# Patient Record
Sex: MALE | Race: WHITE | HISPANIC OR LATINO | URBAN - METROPOLITAN AREA
[De-identification: names, ages, dates, MRNs, and addresses within clinical notes are randomized per-mention and may not be internally consistent; named-entity substitution may affect disease eponyms.]

---

## 2019-09-02 ENCOUNTER — HOSPITAL ENCOUNTER (INPATIENT)
Facility: HOSPITAL | Age: 84
LOS: 1 days | Discharge: HOME | DRG: 342 | End: 2019-09-03
Attending: EMERGENCY MEDICINE | Admitting: FAMILY MEDICINE
Payer: MEDICAID

## 2019-09-02 ENCOUNTER — APPOINTMENT (EMERGENCY)
Dept: RADIOLOGY | Facility: HOSPITAL | Age: 84
DRG: 342 | End: 2019-09-02
Payer: MEDICAID

## 2019-09-02 DIAGNOSIS — S82.042A CLOSED DISPLACED COMMINUTED FRACTURE OF LEFT PATELLA, INITIAL ENCOUNTER: Primary | ICD-10-CM

## 2019-09-02 PROBLEM — I10 ESSENTIAL HYPERTENSION: Chronic | Status: ACTIVE | Noted: 2019-09-02

## 2019-09-02 LAB
ALBUMIN SERPL BCP-MCNC: 3.6 G/DL (ref 3.5–5)
ALP SERPL-CCNC: 142 U/L (ref 46–116)
ALT SERPL W P-5'-P-CCNC: 46 U/L (ref 12–78)
ANION GAP SERPL CALCULATED.3IONS-SCNC: 6 MMOL/L (ref 4–13)
AST SERPL W P-5'-P-CCNC: 36 U/L (ref 5–45)
BASOPHILS # BLD AUTO: 0.03 THOUSANDS/ΜL (ref 0–0.1)
BASOPHILS NFR BLD AUTO: 1 % (ref 0–1)
BILIRUB SERPL-MCNC: 0.5 MG/DL (ref 0.2–1)
BUN SERPL-MCNC: 18 MG/DL (ref 5–25)
CALCIUM SERPL-MCNC: 8.9 MG/DL (ref 8.3–10.1)
CHLORIDE SERPL-SCNC: 106 MMOL/L (ref 100–108)
CO2 SERPL-SCNC: 30 MMOL/L (ref 21–32)
CREAT SERPL-MCNC: 0.92 MG/DL (ref 0.6–1.3)
EOSINOPHIL # BLD AUTO: 0.12 THOUSAND/ΜL (ref 0–0.61)
EOSINOPHIL NFR BLD AUTO: 3 % (ref 0–6)
ERYTHROCYTE [DISTWIDTH] IN BLOOD BY AUTOMATED COUNT: 13.9 % (ref 11.6–15.1)
GFR SERPL CREATININE-BSD FRML MDRD: 75 ML/MIN/1.73SQ M
GLUCOSE SERPL-MCNC: 100 MG/DL (ref 65–140)
HCT VFR BLD AUTO: 44.6 % (ref 36.5–49.3)
HGB BLD-MCNC: 14.6 G/DL (ref 12–17)
IMM GRANULOCYTES # BLD AUTO: 0.02 THOUSAND/UL (ref 0–0.2)
IMM GRANULOCYTES NFR BLD AUTO: 0 % (ref 0–2)
LYMPHOCYTES # BLD AUTO: 1.66 THOUSANDS/ΜL (ref 0.6–4.47)
LYMPHOCYTES NFR BLD AUTO: 35 % (ref 14–44)
MCH RBC QN AUTO: 29.1 PG (ref 26.8–34.3)
MCHC RBC AUTO-ENTMCNC: 32.7 G/DL (ref 31.4–37.4)
MCV RBC AUTO: 89 FL (ref 82–98)
MONOCYTES # BLD AUTO: 0.52 THOUSAND/ΜL (ref 0.17–1.22)
MONOCYTES NFR BLD AUTO: 11 % (ref 4–12)
NEUTROPHILS # BLD AUTO: 2.44 THOUSANDS/ΜL (ref 1.85–7.62)
NEUTS SEG NFR BLD AUTO: 50 % (ref 43–75)
NRBC BLD AUTO-RTO: 0 /100 WBCS
PLATELET # BLD AUTO: 169 THOUSANDS/UL (ref 149–390)
PMV BLD AUTO: 9.2 FL (ref 8.9–12.7)
POTASSIUM SERPL-SCNC: 3.7 MMOL/L (ref 3.5–5.3)
PROT SERPL-MCNC: 7.3 G/DL (ref 6.4–8.2)
RBC # BLD AUTO: 5.02 MILLION/UL (ref 3.88–5.62)
SODIUM SERPL-SCNC: 142 MMOL/L (ref 136–145)
WBC # BLD AUTO: 4.79 THOUSAND/UL (ref 4.31–10.16)

## 2019-09-02 PROCEDURE — 80053 COMPREHEN METABOLIC PANEL: CPT | Performed by: EMERGENCY MEDICINE

## 2019-09-02 PROCEDURE — 36415 COLL VENOUS BLD VENIPUNCTURE: CPT | Performed by: EMERGENCY MEDICINE

## 2019-09-02 PROCEDURE — 85025 COMPLETE CBC W/AUTO DIFF WBC: CPT | Performed by: EMERGENCY MEDICINE

## 2019-09-02 PROCEDURE — 99223 1ST HOSP IP/OBS HIGH 75: CPT | Performed by: FAMILY MEDICINE

## 2019-09-02 PROCEDURE — 99284 EMERGENCY DEPT VISIT MOD MDM: CPT

## 2019-09-02 PROCEDURE — 99284 EMERGENCY DEPT VISIT MOD MDM: CPT | Performed by: EMERGENCY MEDICINE

## 2019-09-02 PROCEDURE — 73564 X-RAY EXAM KNEE 4 OR MORE: CPT

## 2019-09-02 RX ORDER — TRAMADOL HYDROCHLORIDE 50 MG/1
50 TABLET ORAL EVERY 6 HOURS PRN
Status: DISCONTINUED | OUTPATIENT
Start: 2019-09-02 | End: 2019-09-03 | Stop reason: HOSPADM

## 2019-09-02 RX ORDER — PROPRANOLOL HYDROCHLORIDE 10 MG/1
10 TABLET ORAL 3 TIMES DAILY
COMMUNITY

## 2019-09-02 RX ORDER — OXYCODONE HYDROCHLORIDE 10 MG/1
10 TABLET ORAL EVERY 6 HOURS PRN
Status: DISCONTINUED | OUTPATIENT
Start: 2019-09-02 | End: 2019-09-02

## 2019-09-02 RX ORDER — PROPRANOLOL HYDROCHLORIDE 20 MG/1
10 TABLET ORAL 3 TIMES DAILY
Status: DISCONTINUED | OUTPATIENT
Start: 2019-09-02 | End: 2019-09-03 | Stop reason: HOSPADM

## 2019-09-02 RX ORDER — LOSARTAN POTASSIUM 50 MG/1
100 TABLET ORAL
Status: DISCONTINUED | OUTPATIENT
Start: 2019-09-02 | End: 2019-09-03 | Stop reason: HOSPADM

## 2019-09-02 RX ORDER — AMLODIPINE BESYLATE 5 MG/1
5 TABLET ORAL DAILY
COMMUNITY

## 2019-09-02 RX ORDER — LOSARTAN POTASSIUM 50 MG/1
100 TABLET ORAL DAILY
Status: DISCONTINUED | OUTPATIENT
Start: 2019-09-03 | End: 2019-09-02

## 2019-09-02 RX ORDER — LOSARTAN POTASSIUM 100 MG/1
100 TABLET ORAL DAILY
COMMUNITY

## 2019-09-02 RX ORDER — ACETAMINOPHEN 325 MG/1
975 TABLET ORAL EVERY 8 HOURS SCHEDULED
Status: DISCONTINUED | OUTPATIENT
Start: 2019-09-02 | End: 2019-09-03 | Stop reason: HOSPADM

## 2019-09-02 RX ORDER — AMLODIPINE BESYLATE 5 MG/1
5 TABLET ORAL DAILY
Status: DISCONTINUED | OUTPATIENT
Start: 2019-09-03 | End: 2019-09-03 | Stop reason: HOSPADM

## 2019-09-02 RX ORDER — ONDANSETRON 2 MG/ML
4 INJECTION INTRAMUSCULAR; INTRAVENOUS EVERY 6 HOURS PRN
Status: DISCONTINUED | OUTPATIENT
Start: 2019-09-02 | End: 2019-09-03 | Stop reason: HOSPADM

## 2019-09-02 RX ORDER — OXYCODONE HYDROCHLORIDE 5 MG/1
5 TABLET ORAL ONCE
Status: COMPLETED | OUTPATIENT
Start: 2019-09-02 | End: 2019-09-02

## 2019-09-02 RX ORDER — SODIUM CHLORIDE 9 MG/ML
50 INJECTION, SOLUTION INTRAVENOUS CONTINUOUS
Status: DISCONTINUED | OUTPATIENT
Start: 2019-09-03 | End: 2019-09-03 | Stop reason: HOSPADM

## 2019-09-02 RX ORDER — OXYCODONE HYDROCHLORIDE 5 MG/1
5 TABLET ORAL EVERY 6 HOURS PRN
Status: DISCONTINUED | OUTPATIENT
Start: 2019-09-02 | End: 2019-09-03 | Stop reason: HOSPADM

## 2019-09-02 RX ADMIN — LOSARTAN POTASSIUM 100 MG: 50 TABLET, FILM COATED ORAL at 22:06

## 2019-09-02 RX ADMIN — OXYCODONE HYDROCHLORIDE 5 MG: 5 TABLET ORAL at 20:26

## 2019-09-02 RX ADMIN — PROPRANOLOL HYDROCHLORIDE 10 MG: 20 TABLET ORAL at 20:27

## 2019-09-02 RX ADMIN — ACETAMINOPHEN 975 MG: 325 TABLET ORAL at 22:06

## 2019-09-02 NOTE — ASSESSMENT & PLAN NOTE
· POA, status post fall while dancing   Neurovascularly intact   · Admit patient to med/surg under inpatient status  · Ortho consult for surgical repair  · Analgesia  · PT/OT  · NPO after midnight

## 2019-09-02 NOTE — H&P
[address] [facility]  H&P- Maritza Lambert 1933, 80 y o  male MRN: [id number]    Unit/Bed#: JUSTINE Encounter: [id number]    Primary Care Provider: No primary care provider on file  Date and time admitted to hospital: 9/2/2019  4:40 PM        * Closed displaced comminuted fracture of left patella  Assessment & Plan  · POA, status post fall while dancing  Neurovascularly intact   · Admit patient to med/surg under inpatient status  · Ortho consult for surgical repair  · Analgesia  · PT/OT  · NPO after midnight     Essential hypertension  Assessment & Plan  · BP acceptable   · Continue home regimen       VTE Prophylaxis: Enoxaparin (Lovenox)  / sequential compression device   Code Status: Full Code   POLST: POLST form is not discussed and not completed at this time  Discussion with family: Family at bedside     Anticipated Length of Stay:  Patient will be admitted on an Inpatient basis with an anticipated length of stay of  Greater than 2 midnights  Justification for Hospital Stay: As per above assessment and plan     Total Time for Visit, including Counseling / Coordination of Care: 1 hour  Greater than 50% of this total time spent on direct patient counseling and coordination of care  Chief Complaint:   Fall, Knee Pain    History of Present Illness:    Maritza Lambert is a 80 y o  male with a history of HTN who presents with left knee pain  History obtained with assistance of family at bedside who provides translation  Reports that patient was dancing and fell onto his left knee  Was a mechanical fall and no LOC or headstrike  No dizziness or chest pain prior to fall  No numbness, tingling, or weakness in the legs  No prior knee injury  Otherwise denies complaints  Review of Systems:    Review of Systems   Constitutional: Negative  HENT: Negative  Eyes: Negative  Respiratory: Negative  Cardiovascular: Negative  Negative for chest pain  Gastrointestinal: Negative  Genitourinary: Negative  Musculoskeletal: Positive for arthralgias and gait problem  Neurological: Negative for weakness and numbness  All other systems reviewed and are negative  Past Medical and Surgical History:     Past Medical History:   Diagnosis Date    BPH (benign prostatic hyperplasia)     Hypertension        Past Surgical History:   Procedure Laterality Date    EYE SURGERY      PROSTATE BIOPSY         Meds/Allergies:    Prior to Admission medications    Medication Sig Start Date End Date Taking? Authorizing Provider   amLODIPine (NORVASC) 5 mg tablet Take 5 mg by mouth daily   Yes Historical Provider, MD   losartan (COZAAR) 100 MG tablet Take 100 mg by mouth daily   Yes Historical Provider, MD   propranolol (INDERAL) 10 mg tablet Take 10 mg by mouth 3 (three) times a day   Yes Historical Provider, MD     I have reviewed home medications with patient personally  Allergies: No Known Allergies    Social History:     Marital Status: /Civil Union   Occupation: Noncontributory   Patient Pre-hospital Living Situation: Home  Patient Pre-hospital Level of Mobility: Full  Patient Pre-hospital Diet Restrictions: None  Substance Use History:   Social History     Substance and Sexual Activity   Alcohol Use Never    Frequency: Never     Social History     Tobacco Use   Smoking Status Never Smoker   Smokeless Tobacco Never Used     Social History     Substance and Sexual Activity   Drug Use Never       Family History:    History reviewed  No pertinent family history  Physical Exam:     Vitals:   Blood Pressure: 137/93 (09/02/19 1641)  Pulse: 70 (09/02/19 1641)  Respirations: 16 (09/02/19 1641)  Weight - Scale: 62 8 kg (138 lb 7 2 oz) (09/02/19 1641)  SpO2: 98 % (09/02/19 1641)    Physical Exam   Constitutional: He is oriented to person, place, and time  Vital signs are normal  He appears well-developed and well-nourished  Non-toxic appearance  No distress     HENT:   Head: Normocephalic and atraumatic  Mouth/Throat: Mucous membranes are not dry  Eyes: Pupils are equal, round, and reactive to light  Conjunctivae and EOM are normal  No scleral icterus  Pupils are equal    Neck: Neck supple  Cardiovascular: Normal rate, regular rhythm, S1 normal, S2 normal, normal heart sounds and intact distal pulses  Exam reveals no S3 and no S4  No murmur heard  Pulmonary/Chest: Effort normal and breath sounds normal  No accessory muscle usage or stridor  No respiratory distress  He has no decreased breath sounds  He has no wheezes  He has no rhonchi  He has no rales  He exhibits no tenderness  Abdominal: Soft  Bowel sounds are normal  He exhibits no distension and no mass  There is no tenderness  There is no rigidity, no rebound and no guarding  Musculoskeletal:        Left knee: Tenderness found  Left leg in knee immobilizer    Neurological: He is alert and oriented to person, place, and time  He is not disoriented  No cranial nerve deficit or sensory deficit  Skin: Skin is warm and dry  Additional Data:     Lab Results: I have personally reviewed pertinent reports  Results from last 7 days   Lab Units 09/02/19  1748   WBC Thousand/uL 4 79   HEMOGLOBIN g/dL 14 6   HEMATOCRIT % 44 6   PLATELETS Thousands/uL 169   NEUTROS PCT % 50   LYMPHS PCT % 35   MONOS PCT % 11   EOS PCT % 3     Results from last 7 days   Lab Units 09/02/19  1748   SODIUM mmol/L 142   POTASSIUM mmol/L 3 7   CHLORIDE mmol/L 106   CO2 mmol/L 30   BUN mg/dL 18   CREATININE mg/dL 0 92   ANION GAP mmol/L 6   CALCIUM mg/dL 8 9   ALBUMIN g/dL 3 6   TOTAL BILIRUBIN mg/dL 0 50   ALK PHOS U/L 142*   ALT U/L 46   AST U/L 36   GLUCOSE RANDOM mg/dL 100                       Imaging: I have personally reviewed pertinent reports  XR knee 4+ vw left injury   ED Interpretation by Jordan Del Real DO (09/02 1725)   Comminuted, displaced patellar fracture        Final Result by Otilio Baer MD (09/02 1809)      Mildly displaced acute comminuted patellar fracture  Findings concur with ED results as provided in PACS viewer/EPIC at the time of interpretation  Workstation performed: [id number]             EKG, Pathology, and Other Studies Reviewed on Admission:   · XR Left Knee: Mildly displaced acute comminuted patellar fracture     Allscripts / Epic Records Reviewed: Yes     ** Please Note: This note has been constructed using a voice recognition system   **

## 2019-09-03 VITALS
RESPIRATION RATE: 18 BRPM | TEMPERATURE: 97.8 F | OXYGEN SATURATION: 96 % | DIASTOLIC BLOOD PRESSURE: 64 MMHG | WEIGHT: 138.45 LBS | HEART RATE: 55 BPM | HEIGHT: 60 IN | BODY MASS INDEX: 27.18 KG/M2 | SYSTOLIC BLOOD PRESSURE: 107 MMHG

## 2019-09-03 LAB
ABO GROUP BLD: NORMAL
APTT PPP: 32 SECONDS (ref 23–37)
BLD GP AB SCN SERPL QL: NEGATIVE
ERYTHROCYTE [DISTWIDTH] IN BLOOD BY AUTOMATED COUNT: 13.9 % (ref 11.6–15.1)
HCT VFR BLD AUTO: 39.1 % (ref 36.5–49.3)
HGB BLD-MCNC: 12.6 G/DL (ref 12–17)
INR PPP: 1.1 (ref 0.84–1.19)
MCH RBC QN AUTO: 29 PG (ref 26.8–34.3)
MCHC RBC AUTO-ENTMCNC: 32.2 G/DL (ref 31.4–37.4)
MCV RBC AUTO: 90 FL (ref 82–98)
PLATELET # BLD AUTO: 158 THOUSANDS/UL (ref 149–390)
PMV BLD AUTO: 9.7 FL (ref 8.9–12.7)
PROTHROMBIN TIME: 13.6 SECONDS (ref 11.6–14.5)
RBC # BLD AUTO: 4.35 MILLION/UL (ref 3.88–5.62)
RH BLD: POSITIVE
SPECIMEN EXPIRATION DATE: NORMAL
WBC # BLD AUTO: 6.73 THOUSAND/UL (ref 4.31–10.16)

## 2019-09-03 PROCEDURE — G8979 MOBILITY GOAL STATUS: HCPCS

## 2019-09-03 PROCEDURE — 97163 PT EVAL HIGH COMPLEX 45 MIN: CPT

## 2019-09-03 PROCEDURE — 85610 PROTHROMBIN TIME: CPT | Performed by: PHYSICIAN ASSISTANT

## 2019-09-03 PROCEDURE — 97760 ORTHOTIC MGMT&TRAING 1ST ENC: CPT

## 2019-09-03 PROCEDURE — 85027 COMPLETE CBC AUTOMATED: CPT | Performed by: PHYSICIAN ASSISTANT

## 2019-09-03 PROCEDURE — 86900 BLOOD TYPING SEROLOGIC ABO: CPT | Performed by: PHYSICIAN ASSISTANT

## 2019-09-03 PROCEDURE — 99254 IP/OBS CNSLTJ NEW/EST MOD 60: CPT | Performed by: ORTHOPAEDIC SURGERY

## 2019-09-03 PROCEDURE — 86850 RBC ANTIBODY SCREEN: CPT | Performed by: PHYSICIAN ASSISTANT

## 2019-09-03 PROCEDURE — G8978 MOBILITY CURRENT STATUS: HCPCS

## 2019-09-03 PROCEDURE — 86901 BLOOD TYPING SEROLOGIC RH(D): CPT | Performed by: PHYSICIAN ASSISTANT

## 2019-09-03 PROCEDURE — 99239 HOSP IP/OBS DSCHRG MGMT >30: CPT | Performed by: NURSE PRACTITIONER

## 2019-09-03 PROCEDURE — 85730 THROMBOPLASTIN TIME PARTIAL: CPT | Performed by: PHYSICIAN ASSISTANT

## 2019-09-03 RX ORDER — OXYCODONE HYDROCHLORIDE 5 MG/1
5 TABLET ORAL ONCE
Status: COMPLETED | OUTPATIENT
Start: 2019-09-03 | End: 2019-09-03

## 2019-09-03 RX ADMIN — ACETAMINOPHEN 975 MG: 325 TABLET ORAL at 06:20

## 2019-09-03 RX ADMIN — MORPHINE SULFATE 2 MG: 2 INJECTION, SOLUTION INTRAMUSCULAR; INTRAVENOUS at 06:21

## 2019-09-03 RX ADMIN — OXYCODONE HYDROCHLORIDE 5 MG: 5 TABLET ORAL at 00:57

## 2019-09-03 RX ADMIN — OXYCODONE HYDROCHLORIDE 5 MG: 5 TABLET ORAL at 12:34

## 2019-09-03 RX ADMIN — SODIUM CHLORIDE 50 ML/HR: 0.9 INJECTION, SOLUTION INTRAVENOUS at 00:07

## 2019-09-03 NOTE — CONSULTS
Orthopedics   Mary Grace Morales 80 y o  male MRN: [id number]  Unit/Bed#: -01      Chief Complaint:   Left knee pain    HPI:   80 y o  male status post fall from standing height complaining of left knee pain and inability to bear weight  Patient was dancing when he fell directly onto his knee  Pain can be as high as 7 or 8 out 10 as low as 5/10  His pain was immediate inability walk was part of the issue  He came to the ER right away  The injury occurred at 4:00 a m  Yesterday  No previous issues with this knee  Pain is made worse with motion or contact to the area  Denies motor or sensory deficit      Review Of Systems:   · Skin: Normal  · Neuro: See HPI  · Musculoskeletal: See HPI  · 14 point review of systems negative except as stated above     Past Medical History:   Past Medical History:   Diagnosis Date    BPH (benign prostatic hyperplasia)     Hypertension        Past Surgical History:   Past Surgical History:   Procedure Laterality Date    EYE SURGERY      PROSTATE BIOPSY         Family History:  Family history reviewed and non-contributory  History reviewed  No pertinent family history      Social History:  Social History     Socioeconomic History    Marital status: /Civil Union     Spouse name: None    Number of children: None    Years of education: None    Highest education level: None   Occupational History    None   Social Needs    Financial resource strain: None    Food insecurity:     Worry: None     Inability: None    Transportation needs:     Medical: None     Non-medical: None   Tobacco Use    Smoking status: Never Smoker    Smokeless tobacco: Never Used   Substance and Sexual Activity    Alcohol use: Never     Frequency: Never    Drug use: Never    Sexual activity: None   Lifestyle    Physical activity:     Days per week: None     Minutes per session: None    Stress: None   Relationships    Social connections:     Talks on phone: None     Gets together: None Attends Ca[employer] service: None     Active member of club or organization: None     Attends meetings of clubs or organizations: None     Relationship status: None    Intimate partner violence:     Fear of current or ex partner: None     Emotionally abused: None     Physically abused: None     Forced sexual activity: None   Other Topics Concern    None   Social History Narrative    None       Allergies:   No Known Allergies        Labs:  0   Lab Value Date/Time    HCT 39 1 09/03/2019 0452    HCT 44 6 09/02/2019 1748    HGB 12 6 09/03/2019 0452    HGB 14 6 09/02/2019 1748    INR 1 10 09/03/2019 0452    WBC 6 73 09/03/2019 0452    WBC 4 79 09/02/2019 1748       Meds:    Current Facility-Administered Medications:     acetaminophen (TYLENOL) tablet 975 mg, 975 mg, Oral, Q8H [facility], Adams Rendon PA-C, 975 mg at 09/03/19 0620    amLODIPine (NORVASC) tablet 5 mg, 5 mg, Oral, Daily, Adams Rendon PA-C    enoxaparin (LOVENOX) subcutaneous injection 40 mg, 40 mg, Subcutaneous, Daily, Adams Rendon PA-C, Stopped at 09/03/19 0800    losartan (COZAAR) tablet 100 mg, 100 mg, Oral, HS, Sylvester F Jahoor, DO, 100 mg at 09/02/19 2206    morphine injection 2 mg, 2 mg, Intravenous, Q6H PRN, HENRRY Stoner-TAMARA, 2 mg at 09/03/19 0621    ondansetron (ZOFRAN) injection 4 mg, 4 mg, Intravenous, Q6H PRN, HENRRY Stoner-C    oxyCODONE (ROXICODONE) IR tablet 5 mg, 5 mg, Oral, Q6H PRN, Sylvester F Jahoor, DO, 5 mg at 09/03/19 0057    propranolol (INDERAL) tablet 10 mg, 10 mg, Oral, TID, HENRRY Stoner-TAMARA, 10 mg at 09/02/19 2027    sodium chloride 0 9 % infusion, 50 mL/hr, Intravenous, Continuous, Adams Rendon PA-C, Last Rate: 50 mL/hr at 09/03/19 0007, 50 mL/hr at 09/03/19 0007    traMADol (ULTRAM) tablet 50 mg, 50 mg, Oral, Q6H PRN, Adams Rendon PA-C    Blood Culture:   No results found for: BLOODCX    Wound Culture:   No results found for: WOUNDCULT    Ins and Outs:  I/O last 24 hours:   In: -   Out: 350 [Urine:350]          Physical Exam:   /64 (BP Location: Left arm)   Pulse 55   Temp 97 8 °F (36 6 °C) (Oral)   Resp 18   Ht 5' (1 524 m)   Wt 62 8 kg (138 lb 7 2 oz)   SpO2 96%   BMI 27 04 kg/m²   Gen: Alert and oriented to person, place, time  HEENT: EOMI, eyes clear, moist mucus membranes, hearing intact  Respiratory: Bilateral chest rise  No audible wheezing found  Cardiovascular: Regular Rate and Rhythm  Abdomen: soft nontender/nondistended  Musculoskeletal: left lower extremity  · Skin intact  · Tender to palpation over entire knee with a palpable gap in patella  · Moderate knee effusion ecchymosis also evidence  · Unable to perform a straight leg raise  · Unable to tolerate varus/valgus stress due to pain  · Sensation intact L3-S1  · Intact ankle dorsi/plantar flexion, EHL/FHL  · 2+ DP pulse  · Trophic changes of toenails    Radiology:   I personally reviewed the films  X-rays left knee shows transverse comminuted distal 3rd aspect the patella  There is displacement and angulation    _*_*_*_*_*_*_*_*_*_*_*_*_*_*_*_*_*_*_*_*_*_*_*_*_*_*_*_*_*_*_*_*_*_*_*_*_*_*_*_*_*    Assessment:  80 y o  male status post fall with left patella fracture  Plan:   · TT weight bearing left lower extremity in knee immobilizer  · Patient has [place] Medicaid  This presents a problem for postoperative care and follow-up  Initial surgery would be okay but follow-up thereafter would be difficult no therapy or rehab would be covered for the 1st 90 days  We have spoken to the granddaughter and the patient with regards to this  Preference is to be transferred to an accepting hospital in [facility]  Case management working on this  Will need a excepting facility internal medicine and orthopedics  · Dispo: transfer to accepting facitlity   If by car We will order a TROM brace for better support during transport if by ambulance then current [address] adequate    Kody Mccartney DO

## 2019-09-03 NOTE — DISCHARGE SUMMARY
Discharge- Nat Barcenas 1933, 80 y o  male MRN: [id number]    Unit/Bed#: -01 Encounter: [id number]    Primary Care Provider: No primary care provider on file  Date and time admitted to hospital: 9/2/2019  4:40 PM        * Closed displaced comminuted fracture of left patella  Assessment & Plan  · POA, status post fall while dancing  Neurovascularly intact   · Knee XR  · Mildly displaced acute comminuted patellar fracture  · Admit patient to med/surg under inpatient status   · Ortho consult for surgical repair  · Ortho surgery required; pt and pt family requesting d/c due to [employer]  · They will be transporting him to ER in [place] for further fracture management  · Pavithra Dao was attending to facility transfer but family wanted discharge immediately  · Analgesia  · Discharge per family request to self transport to ER in [id number] [facility] hypertension  [address]  · BP acceptable   · Continued home regimen      Discharging Physician / Practitioner: Jay Amanda  PCP: No primary care provider on file  Admission Date:   Admission Orders (From admission, onward)     Ordered        09/02/19 1809  Inpatient Admission  Once                   Discharge Date: 09/03/19    Resolved Problems  Date Reviewed: 9/3/2019    None          Consultations During Hospital Stay:  · Ortho  · Case management     Procedures Performed:   · Knee XR  · Mildly displaced acute comminuted patellar fracture    Significant Findings / Test Results:   · XR as above      Complications:  Pt has [employer]  Family requesting ASAP discharge so they can transport him themselves to an ER in [place]    Reason for Admission: patellar fx with potential surgical intervention    Hospital Course:     Nat Barcenas is a 80 y o  male patient who originally presented to the hospital on 9/2/2019 due to acute patellar fracture  Pt was admitted to have ortho consult for need for surgery   Pt has [employer] and per   Raven Gillis, there is a liability to have surgery and follow up by different medical team  Pt and pt family requesting immediate discharge for [place] ER admission per self transport  Pt and family has been advised that we could facilitate hospital hospital transfer, but family is not agreeable to timeline  Will discharge for further management at [facility]      Condition at Discharge: poor     Discharge Day Visit / Exam:     Subjective:  Pt reports pain 5/10  No other complaints  Vitals: Blood Pressure: 107/64 (09/03/19 0717)  Pulse: 55 (09/03/19 0717)  Temperature: 97 8 °F (36 6 °C) (09/03/19 0717)  Temp Source: Oral (09/03/19 0717)  Respirations: 18 (09/03/19 0717)  Height: 5' (152 4 cm) (09/02/19 1858)  Weight - Scale: 62 8 kg (138 lb 7 2 oz) (09/02/19 1641)  SpO2: 96 % (09/03/19 0717)  Exam:   Physical Exam   Constitutional: He is oriented to person, place, and time  He appears well-nourished  HENT:   Head: Normocephalic  Eyes: Pupils are equal, round, and reactive to light  Cardiovascular: Normal rate, regular rhythm and normal heart sounds  Pulmonary/Chest: Effort normal and breath sounds normal  No respiratory distress  He has no wheezes  Abdominal: Soft  Bowel sounds are normal  He exhibits no distension  There is no tenderness  Musculoskeletal: He exhibits tenderness  L knee currently in immobilizer  Neurological: He is alert and oriented to person, place, and time  Skin: Skin is warm and dry  Capillary refill takes less than 2 seconds  Psychiatric: He has a normal mood and affect  Judgment and thought content normal      Discussion with Family: discussed with family at length regarding hospital hospital transfer  Family not willing to wait for transport and arrangements to be made  Requested he be discharged to be transported by family to ER in [place]  Discharge instructions/Information to patient and family:   See after visit summary for information provided to patient and family  Provisions for Follow-Up Care:  See after visit summary for information related to follow-up care and any pertinent home health orders  Disposition:     Other: care of family to be taken to ER in [place]    Discharge Statement:  I spent 45 minutes discharging the patient  This time was spent on the day of discharge  I had direct contact with the patient on the day of discharge  Greater than 50% of the total time was spent examining patient, answering all patient questions, arranging and discussing plan of care with patient as well as directly providing post-discharge instructions  Additional time then spent on discharge activities  Discharge Medications:  See after visit summary for reconciled discharge medications provided to patient and family        ** Please Note: This note has been constructed using a voice recognition system **

## 2019-09-03 NOTE — DISCHARGE INSTR - AVS FIRST PAGE
Please take to [facility] for further inpatient management of acute patellar fracture  Pt requires immobilizer for transfer  Please maintain alignment and non weight bearing on left leg until orthopedic surgeon approves

## 2019-09-03 NOTE — UTILIZATION REVIEW
Please note this is for an IP request  Send all determinations to our UR Dept Fax [phone]    Notification of Inpatient Admission/Inpatient Authorization Request  This is a Notification of Inpatient Admission/Request for Inpatient Authorization for our facility [address] [facility]  Be advised that this patient was admitted to our facility under Inpatient Status  Please contact the Utilization Review Department where the patient is receiving care services for additional admission information  Place of Service Code: 24   Place of Service Name: Inpatient Hospital  Presentation Date & Time: 9/2/2019  4:40 PM  Inpatient Admission Date & Time: 9/2/19 1809  Discharge Date & Time: 9/3/2019  1:18 PM   Discharge Disposition (if discharged): Admitted as an inpatient to this hospital  Attending Physician: Uriel Snell Md [[id number]]   Attending Physician:  CHRISTINE Pizarro  [facility] Practioner ID- [id number]  [address] [place] [address]  Phone 1: [phone]  Fax: [phone]  Admission Orders (From admission, onward)     Ordered        09/02/19 1809  Inpatient Admission  Once                   Facility: [facility]  Utilization Review Department  Phone: [phone]; Fax [phone]  [email or web address]  ATTENTION: Please call with any questions or concerns to [phone]  and carefully listen to the prompts so that you are directed to the right person  Send all requests for admission clinical reviews, approved or denied determinations and any other requests to fax [phone]   All voicemails are confidential

## 2019-09-03 NOTE — PHYSICAL THERAPY NOTE
Orthotic Note  Date: 9/3/2019  Patient Name: Greer Bennett   Reason for Consult: After speaking to [facility] from Suhail Bearden and Nicole Acharya from case management, and several episodes of clarification via [facility] via phone and in person from ortho (who reportedly was speaking to Dr Diane Benavides), pt recommended to be fit for TROM brace locked in extension prior to DC back to [place] via family transport in car  With pt and family permission, Pt fitted for TROM brace w/family translating to pt (w/ pt's permission) and pt/family educated in purpose of brace, don/doffing of brace, 2 finger breaths of space @ straps, keeping brace locked in extension  Via translation, pt reports brace feels "better" than knee extension brace, but pt still requires A for brace management  Recommendations: Recommend pt wear brace as per ortho, and f/u with ortho upon DC in [place]         PHYSICAL THERAPY EVALUATION  NAME:  [facility] [address]: 09/03/19    AGE:   80 y o  Mrn:   [id number]  ADMIT DX:  Knee injury [S89 90XA]  Closed displaced comminuted fracture of left patella, initial encounter [S82 042A]    Past Medical History:   Diagnosis Date    BPH (benign prostatic hyperplasia)     Hypertension      Length Of Stay: 1  Performed at least 2 patient identifiers during session: Name and Birthday  PHYSICAL THERAPY EVALUATION :    09/03/19 1300   Note Type   Note type Eval only   Pain Assessment   Pain Assessment 0-10   Pain Score 7   Pain Type Acute pain   Pain Location Knee   Pain Orientation Left   Effect of Pain on Daily Activities limits speed and indep of mobility, limits dependent position of LE   Patient's Stated Pain Goal No pain   Hospital Pain Intervention(s) Repositioned; Ambulation/increased activity; Medication (See MAR)  (RN medicated pt prior to session)   [address]   (steps)   Home Equipment   (intermittent cane use)   Prior Function   Level of [place] Independent with ADLs and functional mobility ADL Assistance Independent   Falls in the last 6 months 1 to 4   Restrictions/Precautions   Weight Bearing Precautions Per Order Yes   LLE Weight Bearing Per Order TTWB   Braces or Orthoses LE Immobilizer  (fitted into hinged knee brace)   General   Family/Caregiver Present No   Cognition   Overall Cognitive Status WFL   Arousal/Participation Alert   Orientation Level Oriented X4   Memory Within functional limits   Following Commands Follows one step commands with increased time or repetition   RUE Strength   RUE Overall Strength Within Functional Limits - strength 5/5   LUE Strength   LUE Overall Strength Within Functional Limits - strength 5/5   Strength RLE   R Hip Flexion 4+/5   R Knee Extension 4+/5   R Ankle Dorsiflexion 4+/5   Strength LLE   L Hip Flexion   (<3)   L Ankle Dorsiflexion   (tested to 3)   Coordination   Movements are Fluid and Coordinated 1  (but antalgic)   Sensation   (vision and hearing)   Light Touch   RLE Light Touch Grossly intact   LLE Light Touch Grossly intact   Bed Mobility   Supine to Sit 3  Moderate assistance   Additional items Assist x 1;HOB elevated; Increased time required;Verbal cues   Transfers   Sit to Stand 4  Minimal assistance   Additional items Assist x 1; Increased time required;Verbal cues  (to keep TTWB on LLE)   Stand to Sit 4  Minimal assistance   Additional items Assist x 1; Increased time required;Bed elevated  (to keep TTWB on LLE)   Stand pivot 4  Minimal assistance  (turn toward R side  )   Additional items Assist x 2; Increased time required;Verbal cues;Armrests  (to keep TTWB on LLE)   Car transfer 4  Minimal assistance  (pt actually able to hop on 1LE to get close w/ TTWB)   Additional items Assist x 2; Increased time required;Verbal cues;Armrests  (toward R side, 3rd A to ease trasition of scoot across)   Additional Comments Pt/family instructed to perform car transfer to drivers side rear seat and scoot across back to keep LLE elevated, allow for use of seat belt, and mobility of RLE    Ambulation/Elevation   Gait pattern Not tested  (as family is requesting to DC to [place]/to car  )   Balance   Static Sitting Good   Static Standing Fair -   Endurance Deficit   Endurance Deficit Yes   Endurance Deficit Description limited standing tolerance   Activity Tolerance   Activity Tolerance Patient limited by pain; Patient limited by fatigue   Medical Staff Made Aware care coordination w/Royal and Lucy Dominguez from case management, Jessica Gaona and Mony Ralph from ortho; Ivan Castillo clinical director PT; Karena Claude from [place] [id number] spoke to Beatris Prajapati RN   Assessment   Prognosis Fair   Problem List Decreased strength;Decreased range of motion; Impaired balance;Decreased mobility; Decreased coordination;Decreased cognition;Decreased endurance;Orthopedic restrictions;Pain  (gait deviations)   Barriers to Discharge Decreased caregiver support   Goals   Patient Goals to get to [address]   Treatment/Interventions Spoke to nursing;Spoke to case management;Spoke to advanced practitioner   PT Frequency   (Pt DC to care of family reportedly requesting DC)   Recommendation   Recommendation   (recommend pivot transfers w/A Of 1-2)   Equipment Recommended   (eventually walker vs WC)   Barthel Index   Feeding 10   Bathing 0   Grooming Score 5   Dressing Score 5   Bladder Score 10   Bowels Score 10   Toilet Use Score 5   Transfers (Bed/Chair) Score 5   Mobility (Level Surface) Score 0   Stairs Score 0   Barthel Index Score 50   (Please find full objective findings from PT assessment regarding body systems outlined above)  Assessment: Pt is a 80 y o  male seen for PT evaluation s/p admit to [facility] on 9/2/2019 w/ Closed displaced comminuted fracture of left patella  Ortho Plan: "TT weight bearing left lower extremity in knee immobilizer, Dispo: transfer to accepting facility   If by car:TROM brace for better support during transport if by ambulance then [facility] "  Order placed for PT  Upon evaluation: Pt requires 1 person assistance for bed mobility and 1-2 person assistance for transfers bed to WC, WC to car, but A Of 3rd to complete across back seat of car  Ambulation NT as family does not wish to get WC, walker for DC while in [place]  Pt's clinical presentation is currently unstable/unpredictable given the functional mobility deficits above, especially weakness, decreased ROM, pain and orthopedic restrictions, coupled with fall risks including limited WB status, hx of falls, impaired balance and impaired coordination, and combined with medical complications of need for surgery  From PT/mobility standpoint, recommendation at time of d/c would be that pt requires A of 1-2 for mobility to perform continued mobility, but pt was able to maintain TTWB in [facility] brace  Pt//family agreeable to brace, but declined any DME until they return to [place]  Pt/family verbalized understanding of training during PT eval  No further IPPT indicated at this time, but strongly recommend PT follow up upon next level of care in [place]  Comorbidities affecting pt's physical performance at time of assessment include: HTN  Personal factors affecting pt at time of IE include: affordability, steps to enter environment, multi-level environment, advanced age, inability to perform IADLs, recent fall(s) and preferred language not [place] (language barrier)       Andrea Moore, PT, DPT

## 2019-09-03 NOTE — ASSESSMENT & PLAN NOTE
· POA, status post fall while dancing  Neurovascularly intact   · Knee XR  · Mildly displaced acute comminuted patellar fracture    · Admit patient to med/surg under inpatient status   · Ortho consult for surgical repair  · Ortho surgery required; pt and pt family requesting d/c due to [employer]  · They will be transporting him to ER in [place] for further fracture management  · [facility] was attending to facility transfer but family wanted discharge immediately  · Analgesia  · Discharge per family request to self transport to ER in [place]

## 2019-09-03 NOTE — SOCIAL WORK
CM met with consulting physician Dr Casimiro Garcia at bedside to review plan with patient and granddaughter at bedside  Consulting physician and Dr Fenton determined that patient would be best served in [place] due to follow up care needs  Patient and granddaughter agree with plan  CM will await specific hospital patient uses and CM will help with process  CM department will continue to follow through discharge  CM was informed that patients spouse and daughter, and granddaughter planned to pick patient up and take him to a [facility] ED  Family declined hospital transfer and opted to transfer patient via car  Prior to discharge ortho brace ordered  Ortho brace being orders and given to patient  No other CM needs

## 2019-09-03 NOTE — PLAN OF CARE
Problem: Potential for Falls  Goal: Patient will remain free of falls  Description  INTERVENTIONS:  - Assess patient frequently for physical needs  -  Identify cognitive and physical deficits and behaviors that affect risk of falls    -  [place] fall precautions as indicated by assessment   - Educate patient/family on patient safety including physical limitations  - Instruct patient to call for assistance with activity based on assessment  - Modify environment to reduce risk of injury  - Consider OT/PT consult to assist with strengthening/mobility  Outcome: Progressing     Problem: Prexisting or High Potential for Compromised Skin Integrity  Goal: Skin integrity is maintained or improved  Description  INTERVENTIONS:  - Identify patients at risk for skin breakdown  - Assess and monitor skin integrity  - Assess and monitor nutrition and hydration status  - Monitor labs   - Assess for incontinence   - Turn and reposition patient  - Assist with mobility/ambulation  - Relieve pressure over bony prominences  - Avoid friction and shearing  - Provide appropriate hygiene as needed including keeping skin clean and dry  - Evaluate need for skin moisturizer/barrier cream  - Collaborate with interdisciplinary team   - Patient/family teaching  - Consider wound care consult   Outcome: Progressing

## 2019-09-03 NOTE — PLAN OF CARE
Problem: Potential for Falls  Goal: Patient will remain free of falls  Description  INTERVENTIONS:  - Assess patient frequently for physical needs  -  Identify cognitive and physical deficits and behaviors that affect risk of falls    -  [place] fall precautions as indicated by assessment   - Educate patient/family on patient safety including physical limitations  - Instruct patient to call for assistance with activity based on assessment  - Modify environment to reduce risk of injury  - Consider OT/PT consult to assist with strengthening/mobility  Outcome: Progressing     Problem: Prexisting or High Potential for Compromised Skin Integrity  Goal: Skin integrity is maintained or improved  Description  INTERVENTIONS:  - Identify patients at risk for skin breakdown  - Assess and monitor skin integrity  - Assess and monitor nutrition and hydration status  - Monitor labs   - Assess for incontinence   - Turn and reposition patient  - Assist with mobility/ambulation  - Relieve pressure over bony prominences  - Avoid friction and shearing  - Provide appropriate hygiene as needed including keeping skin clean and dry  - Evaluate need for skin moisturizer/barrier cream  - Collaborate with interdisciplinary team   - Patient/family teaching  - Consider wound care consult   Outcome: Progressing

## 2019-09-03 NOTE — UTILIZATION REVIEW
Initial Clinical Review    Admission: Date/Time/Statement: Inpatient Admission Orders (From admission, onward)     Ordered        09/02/19 1809  Inpatient Admission  Once                   Orders Placed This Encounter   Procedures    Inpatient Admission     Standing Status:   Standing     Number of Occurrences:   1     Order Specific Question:   Admitting Physician     Answer:   Snato La [[id number]]     Order Specific Question:   Level of Care     Answer:   Med Surg [16]     Order Specific Question:   Estimated length of stay     Answer:   More than 2 Midnights     Order Specific Question:   Certification     Answer:   I certify that inpatient services are medically necessary for this patient for a duration of greater than two midnights  See H&P and MD Progress Notes for additional information about the patient's course of treatment  ED Arrival Information     Expected Arrival Acuity Means of Arrival Escorted By Service Admission Type    - 9/2/2019 16:40 Urgent Wheelchair Family Member Hospitalist Urgent    Arrival Complaint    KNEE INJURY        Chief Complaint   Patient presents with    Knee Injury     Pt fell directly onto left knee  Assessment/Plan: This is a 80year old male from home to ED admitted as inpatient due to closed displaced comminuted fracture of left patella  Presented post fall about 30 minutes prior to arrival, he was dancing and lost balance  He had immediate pain of left knee with associated swelling and ecchymosis  On exam of left knee - has tenderness over the patella with decreased ROM, swelling and ecchymosis  Xray left knee shows comminuted displaced patellar fracture  Pain control in progress  Orthopedics consulted  9/3/2019 per orthopedics - 80 y o  male status post fall with left patella fracture  Plan is TT weight bearing LLE in knee immobilizer  Recommend transfer to accepting facility in [facility]        ED Triage Vitals   Temperature Pulse Respirations Blood Pressure SpO2   09/02/19 1846 09/02/19 1641 09/02/19 1641 09/02/19 1641 09/02/19 1641   98 7 °F (37 1 °C) 70 16 137/93 98 %      Temp Source Heart Rate Source Patient Position - Orthostatic VS BP Location FiO2 (%)   09/02/19 1846 09/02/19 1641 09/02/19 1641 09/02/19 1641 --   Oral Monitor Lying Right arm       Pain Score       09/02/19 1641       7        Wt Readings from Last 1 Encounters:   09/02/19 62 8 kg (138 lb 7 2 oz)     Additional Vital Signs:   09/03/19 0717  97 8 °F (36 6 °C)  55  18  107/64  78  96 %  None (Room air)  Lying   09/02/19 2325  98 9 °F (37 2 °C)  62  18  122/68  --  98 %  None (Room air)  Lying   09/02/19 2203  --  68  --  146/79  --  --  --  Lying   09/02/19 2025  --  66  --  126/65  --  --  --  Lying   09/02/19 1846  98 7 °F (37 1 °C)  60  16  133/78  --  98 %  None (Room air)         Pertinent Labs/Diagnostic Test Results:   9/2/2019  Xray left knee  - Mildly displaced acute comminuted patellar fracture  Results from last 7 days   Lab Units 09/03/19  0452 09/02/19  1748   WBC Thousand/uL 6 73 4 79   HEMOGLOBIN g/dL 12 6 14 6   HEMATOCRIT % 39 1 44 6   PLATELETS Thousands/uL 158 169   NEUTROS ABS Thousands/µL  --  2 44     Results from last 7 days   Lab Units 09/02/19  1748   SODIUM mmol/L 142   POTASSIUM mmol/L 3 7   CHLORIDE mmol/L 106   CO2 mmol/L 30   ANION GAP mmol/L 6   BUN mg/dL 18   CREATININE mg/dL 0 92   EGFR ml/min/1 73sq m 75   CALCIUM mg/dL 8 9     Results from last 7 days   Lab Units 09/02/19  1748   AST U/L 36   ALT U/L 46   ALK PHOS U/L 142*   TOTAL PROTEIN g/dL 7 3   ALBUMIN g/dL 3 6   TOTAL BILIRUBIN mg/dL 0 50     Results from last 7 days   Lab Units 09/02/19  1748   GLUCOSE RANDOM mg/dL 100     Results from last 7 days   Lab Units 09/03/19  0452   PROTIME seconds 13 6   INR  1 10   PTT seconds 32     ED Treatment:   Medication Administration from 09/02/2019 1640 to 09/02/2019 1846     None        Past Medical History:   Diagnosis Date    BPH (benign prostatic hyperplasia)     Hypertension      Present on Admission:   Closed displaced comminuted fracture of left patella   Essential hypertension      Admitting Diagnosis: Knee injury [S89 90XA]  Closed displaced comminuted fracture of left patella, initial encounter [S82 042A]  Age/Sex: 80 y o  male  Admission Orders: 9/2/2019  1809 INPATIENT     Current Facility-Administered Medications:  acetaminophen 975 mg Oral Q8H [facility] & longterm    amLODIPine 5 mg Oral Daily    enoxaparin 40 mg Subcutaneous Daily    losartan 100 mg Oral HS    morphine injection- used x 1  2 mg Intravenous Q6H PRN 0621   ondansetron 4 mg Intravenous Q6H PRN    oxyCODONE- used x 1  5 mg Oral Q6H PRN    oxyCODONE 5 mg Oral Once    propranolol 10 mg Oral TID    sodium chloride 50 mL/hr Intravenous Continuous Last Rate: 50 mL/hr (09/03/19 0007)   traMADol 50 mg Oral Q6H PRN        IP CONSULT TO ORTHOPEDIC SURGERY  Knee brace - T-ROM brace locked in extension  Network Utilization Review Department  Phone: [phone]; Fax [phone]  [email or web address]  ATTENTION: Please call with any questions or concerns to [phone]  and carefully listen to the prompts so that you are directed to the right person  Send all requests for admission clinical reviews, approved or denied determinations and any other requests to fax [phone]   All voicemails are confidential

## 2019-09-04 ENCOUNTER — HOSPITAL ENCOUNTER (EMERGENCY)
Facility: HOSPITAL | Age: 84
Discharge: HOME | End: 2019-09-04
Attending: EMERGENCY MEDICINE | Admitting: EMERGENCY MEDICINE
Payer: MEDICAID

## 2019-09-04 VITALS
HEART RATE: 83 BPM | BODY MASS INDEX: 30.35 KG/M2 | RESPIRATION RATE: 20 BRPM | TEMPERATURE: 98.6 F | DIASTOLIC BLOOD PRESSURE: 97 MMHG | SYSTOLIC BLOOD PRESSURE: 175 MMHG | WEIGHT: 155.42 LBS | OXYGEN SATURATION: 96 %

## 2019-09-04 DIAGNOSIS — S82.042A CLOSED DISPLACED COMMINUTED FRACTURE OF LEFT PATELLA, INITIAL ENCOUNTER: Primary | ICD-10-CM

## 2019-09-04 PROCEDURE — 99283 EMERGENCY DEPT VISIT LOW MDM: CPT

## 2019-09-04 PROCEDURE — 99284 EMERGENCY DEPT VISIT MOD MDM: CPT | Performed by: PHYSICIAN ASSISTANT

## 2019-09-04 RX ORDER — OXYCODONE HYDROCHLORIDE 5 MG/1
5 TABLET ORAL EVERY 6 HOURS PRN
Qty: 7 TABLET | Refills: 0 | Status: SHIPPED | OUTPATIENT
Start: 2019-09-04 | End: 2019-09-11

## 2019-09-04 RX ORDER — OXYCODONE HYDROCHLORIDE 5 MG/1
5 TABLET ORAL ONCE
Status: COMPLETED | OUTPATIENT
Start: 2019-09-04 | End: 2019-09-04

## 2019-09-04 RX ORDER — ACETAMINOPHEN 500 MG
500 TABLET ORAL EVERY 6 HOURS PRN
Qty: 30 TABLET | Refills: 0 | Status: SHIPPED | OUTPATIENT
Start: 2019-09-04

## 2019-09-04 RX ADMIN — OXYCODONE HYDROCHLORIDE 5 MG: 5 TABLET ORAL at 09:37

## 2019-09-04 NOTE — ED PROVIDER NOTES
History  Chief Complaint   Patient presents with    Knee Injury     S/p fall on monday, seen here, fx patella , admitted at that time, was scheduled for surgery at that time, which was cancelled     Tee Ascencio is an 66-year-old male presenting for re-evaluation of injury to left knee  Patient had a fall 4 days ago while at home, landing on left anterior knee  Patient seen in ED at that time and diagnosed with a closed displaced comminuted fracture of left patella  Patient was admitted for orthopedic consult/surgery, but patient left with family due to an insurance issue  Patient presents with family at this time, and is agreeable to admission/treatment within this facility  Patient has knee immobilizer in place at time of presentation  History provided by:  Patient and relative   used: Yes    Knee Pain   Location:  Knee  Time since incident:  4 days  Injury: yes    Mechanism of injury: fall    Fall:     Fall occurred:  Standing  Knee location:  L knee  Pain details:     Quality:  Aching and throbbing    Radiates to:  Does not radiate    Onset quality:  Sudden    Timing:  Constant    Progression:  Waxing and waning  Chronicity:  New  Relieved by: oxycodone  Associated symptoms: swelling    Associated symptoms: no back pain, no fever, no neck pain and no tingling        Prior to Admission Medications   Prescriptions Last Dose Informant Patient Reported? Taking? amLODIPine (NORVASC) 5 mg tablet   Yes Yes   Sig: Take 5 mg by mouth daily   losartan (COZAAR) 100 MG tablet   Yes Yes   Sig: Take 100 mg by mouth daily   propranolol (INDERAL) 10 mg tablet   Yes Yes   Sig: Take 10 mg by mouth 3 (three) times a day      Facility-Administered Medications: None       Past Medical History:   Diagnosis Date    BPH (benign prostatic hyperplasia)     Hypertension        Past Surgical History:   Procedure Laterality Date    EYE SURGERY      PROSTATE BIOPSY         History reviewed   No pertinent family history  I have reviewed and agree with the history as documented  Social History     Tobacco Use    Smoking status: Never Smoker    Smokeless tobacco: Never Used   Substance Use Topics    Alcohol use: Never     Frequency: Never    Drug use: Never        Review of Systems   Constitutional: Negative for chills and fever  HENT: Negative for congestion, rhinorrhea and sore throat  Eyes: Negative for pain and visual disturbance  Respiratory: Negative for cough, shortness of breath and wheezing  Cardiovascular: Negative for chest pain and palpitations  Gastrointestinal: Negative for abdominal pain, nausea and vomiting  Genitourinary: Negative for dysuria, frequency and urgency  Musculoskeletal: Positive for arthralgias and joint swelling  Negative for back pain, neck pain and neck stiffness  Skin: Negative for rash and wound  Neurological: Negative for dizziness, weakness, light-headedness and numbness  Physical Exam  Physical Exam   Constitutional: He is oriented to person, place, and time  He appears well-developed and well-nourished  No distress  HENT:   Head: Normocephalic and atraumatic  Right Ear: External ear normal    Left Ear: External ear normal    Mouth/Throat: Oropharynx is clear and moist    Eyes: Pupils are equal, round, and reactive to light  Conjunctivae and EOM are normal    Neck: Normal range of motion  Neck supple  Cardiovascular: Normal rate, regular rhythm, normal heart sounds and intact distal pulses  Exam reveals no gallop and no friction rub  No murmur heard  2+ dorsalis pedis, posterior tibial pulse L leg; negative Fabiola's sign LLE   Pulmonary/Chest: Effort normal and breath sounds normal  No respiratory distress  He has no wheezes  Abdominal: Soft  He exhibits no distension  There is no tenderness  Musculoskeletal: He exhibits edema and tenderness     Knee immobilizer in place L knee; moderate edema noted to L knee; diffuse TTP Lymphadenopathy:     He has no cervical adenopathy  Neurological: He is alert and oriented to person, place, and time  No sensory deficit  He exhibits normal muscle tone  Coordination normal    Skin: Skin is warm and dry  Capillary refill takes less than 2 seconds  No rash noted  He is not diaphoretic  No erythema  Psychiatric: He has a normal mood and affect  His behavior is normal  Judgment and thought content normal        Vital Signs  ED Triage Vitals   Temperature Pulse Respirations Blood Pressure SpO2   09/04/19 0832 09/04/19 0825 09/04/19 0825 09/04/19 0825 09/04/19 0825   98 6 °F (37 °C) 83 20 (!) 175/97 96 %      Temp Source Heart Rate Source Patient Position - Orthostatic VS BP Location FiO2 (%)   09/04/19 0832 -- -- -- --   Oral          Pain Score       09/04/19 0825       7           Vitals:    09/04/19 0825   BP: (!) 175/97   Pulse: 83         Visual Acuity      ED Medications  Medications   oxyCODONE (ROXICODONE) IR tablet 5 mg (5 mg Oral Given 9/4/19 8992)       Diagnostic Studies  Results Reviewed     None                 No orders to display              Procedures  Procedures       ED Course                               MDM  Number of Diagnoses or Management Options  Closed displaced comminuted fracture of left patella, initial encounter:   Diagnosis management comments: Pt seen at [facility] ED yesterday for same and discharged with instructions for f/u outpatient with orthopedics  Pt stable at this time, pain well controlled at discharge  Pt to follow up with orthopedics in [place] through [facility] office to schedule follow up  Will rx tylenol for pain, roxicodone for breakthrough pain, advise elevation, ice at home  Family at home to assist pt  Pt and family verbalize understanding of strict return indications and importance of prompt f/u with orthopedics          Amount and/or Complexity of Data Reviewed  Tests in the radiology section of CPT®: reviewed    Patient Progress  Patient progress: stable      Disposition  Final diagnoses:   Closed displaced comminuted fracture of left patella, initial encounter     Time reflects when diagnosis was documented in both MDM as applicable and the Disposition within this note     Time User Action Codes Description Comment    9/4/2019 10:47 AM Tanisha Blackwell Add [S82 042A] Closed displaced comminuted fracture of left patella, initial encounter     9/4/2019 11:13 AM Tanisha Matilde Add [P33 826B] Patella fracture     9/4/2019 11:13 AM Tanisha Blackwell Modify [K72 649A] Closed displaced comminuted fracture of left patella, initial encounter     9/4/2019 11:13 AM Tanisha Matilde Modify [S82 009A] Patella fracture     9/4/2019 11:13 AM Tanisha Matilde Modify [S82 042A] Closed displaced comminuted fracture of left patella, initial encounter     9/4/2019 11:13 AM Tanisha Blackwell Remove [[id number]] Patella fracture       ED Disposition     ED Disposition Condition Date/Time Comment    Discharge Stable Wed Sep 4, 2019 10:47 AM Ardath Boxer discharge to home/self care  Follow-up Information     Follow up With Specialties Details Why Contact Info    Orthopedic Surgery - Through Medicaid  Schedule an appointment as soon as possible for a visit             Patient's Medications   Discharge Prescriptions    ACETAMINOPHEN (TYLENOL) 500 MG TABLET    Take 1 tablet (500 mg total) by mouth every 6 (six) hours as needed for mild pain or moderate pain       Start Date: 9/4/2019  End Date: --       Order Dose: 500 mg       Quantity: 30 tablet    Refills: 0    OXYCODONE (ROXICODONE) 5 MG IMMEDIATE RELEASE TABLET    Take 1 tablet (5 mg total) by mouth every 6 (six) hours as needed for severe pain for up to 7 daysMax Daily Amount: 20 mg       Start Date: 9/4/2019  End Date: 9/11/2019       Order Dose: 5 mg       Quantity: 7 tablet    Refills: 0     No discharge procedures on file      ED Provider  Electronically Signed by Primitivo Crespo PA-C  09/04/19 1118

## 2019-09-04 NOTE — DISCHARGE INSTRUCTIONS
Please refer to the attached information for strict return instructions  If symptoms worsen or new symptoms develop please return to the Er   Please follow up with orthopedics/orthopedic surgery as soon as possible

## 2020-08-24 NOTE — ED PROVIDER NOTES
History  Chief Complaint   Patient presents with    Knee Injury     Pt fell directly onto left knee  Patient is an 80-year-old male with a history of hypertension and BPH who presents with a left knee injury  Patient was dancing and lost his balance  He fell directly onto his left knee  He had immediate pain in his left knee with associated swelling and ecchymosis  He has been unable to bear weight since the fall  He denies head injury  He has not taken anything for the pain  He came directly to the emergency department from the scene  He is not on anticoagulation  History provided by:  Patient  Fall   Mechanism of injury: fall    Injury location:  Leg  Leg injury location:  L knee  Time since incident:  30 minutes  Arrived directly from scene: yes    Fall:     Fall occurred:  Standing    Point of impact:  Knees  Suspicion of alcohol use: no    Suspicion of drug use: no    Tetanus status:  Unknown  Associated symptoms: no abdominal pain, no back pain, no chest pain, no difficulty breathing, no headaches, no loss of consciousness, no nausea, no neck pain and no vomiting        Prior to Admission Medications   Prescriptions Last Dose Informant Patient Reported? Taking? amLODIPine (NORVASC) 5 mg tablet   Yes Yes   Sig: Take 5 mg by mouth daily   losartan (COZAAR) 100 MG tablet   Yes Yes   Sig: Take 100 mg by mouth daily   propranolol (INDERAL) 10 mg tablet   Yes Yes   Sig: Take 10 mg by mouth 3 (three) times a day      Facility-Administered Medications: None       Past Medical History:   Diagnosis Date    BPH (benign prostatic hyperplasia)     Hypertension        Past Surgical History:   Procedure Laterality Date    EYE SURGERY      PROSTATE BIOPSY         History reviewed  No pertinent family history  I have reviewed and agree with the history as documented      Social History     Tobacco Use    Smoking status: Never Smoker    Smokeless tobacco: Never Used   Substance Use Topics    Alcohol use: Never     Frequency: Never    Drug use: Never        Review of Systems   Constitutional: Negative for chills, diaphoresis and fever  HENT: Negative for nosebleeds, sore throat and trouble swallowing  Eyes: Negative for photophobia, pain and visual disturbance  Respiratory: Negative for cough, chest tightness and shortness of breath  Cardiovascular: Negative for chest pain, palpitations and leg swelling  Gastrointestinal: Negative for abdominal pain, constipation, diarrhea, nausea and vomiting  Endocrine: Negative for polydipsia and polyuria  Genitourinary: Negative for difficulty urinating, dysuria and hematuria  Musculoskeletal: Positive for arthralgias and gait problem  Negative for back pain, neck pain and neck stiffness  Skin: Negative for pallor and rash  Neurological: Negative for dizziness, loss of consciousness, syncope, light-headedness and headaches  All other systems reviewed and are negative  Physical Exam  Physical Exam   Constitutional: He is oriented to person, place, and time  He appears well-developed and well-nourished  No distress  HENT:   Head: Normocephalic and atraumatic  Mouth/Throat: Oropharynx is clear and moist and mucous membranes are normal    Eyes: Pupils are equal, round, and reactive to light  EOM are normal    Neck: Normal range of motion  Neck supple  Cardiovascular: Normal rate, regular rhythm, normal heart sounds, intact distal pulses and normal pulses  Pulmonary/Chest: Effort normal and breath sounds normal  No respiratory distress  Abdominal: Soft  He exhibits no distension  There is no tenderness  There is no rigidity, no rebound and no guarding  Musculoskeletal: He exhibits no edema  Left knee: He exhibits decreased range of motion, swelling and ecchymosis  Tenderness (Tenderness over the patella with palpable defect consistent with displaced, transverse patellar fracture ) found     Lymphadenopathy:     He has no cervical adenopathy  Neurological: He is alert and oriented to person, place, and time  He has normal strength  No cranial nerve deficit or sensory deficit  Skin: Skin is warm and dry  Capillary refill takes less than 2 seconds  Psychiatric: He has a normal mood and affect  Nursing note and vitals reviewed        Vital Signs  ED Triage Vitals   Temperature Pulse Respirations Blood Pressure SpO2   09/02/19 1846 09/02/19 1641 09/02/19 1641 09/02/19 1641 09/02/19 1641   98 7 °F (37 1 °C) 70 16 137/93 98 %      Temp Source Heart Rate Source Patient Position - Orthostatic VS BP Location FiO2 (%)   09/02/19 1846 09/02/19 1641 09/02/19 1641 09/02/19 1641 --   Oral Monitor Lying Right arm       Pain Score       09/02/19 1641       7           Vitals:    09/02/19 1641 09/02/19 1846 09/02/19 2025   BP: 137/93 133/78 126/65   Pulse: 70 60 66   Patient Position - Orthostatic VS: Lying Lying Lying         Visual Acuity      ED Medications  Medications   amLODIPine (NORVASC) tablet 5 mg (has no administration in time range)   propranolol (INDERAL) tablet 10 mg (10 mg Oral Given 9/2/19 2027)   sodium chloride 0 9 % infusion (has no administration in time range)   ondansetron (ZOFRAN) injection 4 mg (has no administration in time range)   enoxaparin (LOVENOX) subcutaneous injection 40 mg (has no administration in time range)   acetaminophen (TYLENOL) tablet 975 mg (has no administration in time range)   traMADol (ULTRAM) tablet 50 mg (has no administration in time range)   morphine injection 2 mg (has no administration in time range)   losartan (COZAAR) tablet 100 mg (has no administration in time range)   oxyCODONE (ROXICODONE) IR tablet 5 mg (has no administration in time range)   oxyCODONE (ROXICODONE) IR tablet 5 mg (5 mg Oral Given 9/2/19 2026)       Diagnostic Studies  Results Reviewed     Procedure Component Value Units Date/Time    Comprehensive metabolic panel [[id number]]  (Abnormal) Collected:  09/02/19 4288    Lab Status: Final result Specimen:  Blood from Arm, Right Updated:  09/02/19 1811     Sodium 142 mmol/L      Potassium 3 7 mmol/L      Chloride 106 mmol/L      CO2 30 mmol/L      ANION GAP 6 mmol/L      BUN 18 mg/dL      Creatinine 0 92 mg/dL      Glucose 100 mg/dL      Calcium 8 9 mg/dL      AST 36 U/L      ALT 46 U/L      Alkaline Phosphatase 142 U/L      Total Protein 7 3 g/dL      Albumin 3 6 g/dL      Total Bilirubin 0 50 mg/dL      eGFR 75 ml/min/1 73sq m     Narrative:       [place] guidelines for Chronic Kidney Disease (CKD):     Stage 1 with normal or high GFR (GFR > 90 mL/min/1 73 square meters)    Stage 2 Mild CKD (GFR = 60-89 mL/min/1 73 square meters)    Stage 3A Moderate CKD (GFR = 45-59 mL/min/1 73 square meters)    Stage 3B Moderate CKD (GFR = 30-44 mL/min/1 73 square meters)    Stage 4 Severe CKD (GFR = 15-29 mL/min/1 73 square meters)    Stage 5 End Stage CKD (GFR <15 mL/min/1 73 square meters)  Note: GFR calculation is accurate only with a steady state creatinine    CBC and differential [[id number]] Collected:  09/02/19 1748    Lab Status:  Final result Specimen:  Blood from Arm, Right Updated:  09/02/19 1757     WBC 4 79 Thousand/uL      RBC 5 02 Million/uL      Hemoglobin 14 6 g/dL      Hematocrit 44 6 %      MCV 89 fL      MCH 29 1 pg      MCHC 32 7 g/dL      RDW 13 9 %      MPV 9 2 fL      Platelets 901 Thousands/uL      nRBC 0 /100 WBCs      Neutrophils Relative 50 %      Immat GRANS % 0 %      Lymphocytes Relative 35 %      Monocytes Relative 11 %      Eosinophils Relative 3 %      Basophils Relative 1 %      Neutrophils Absolute 2 44 Thousands/µL      Immature Grans Absolute 0 02 Thousand/uL      Lymphocytes Absolute 1 66 Thousands/µL      Monocytes Absolute 0 52 Thousand/µL      Eosinophils Absolute 0 12 Thousand/µL      Basophils Absolute 0 03 Thousands/µL                  XR knee 4+ vw left injury   ED Interpretation by Cynthia Larose DO (09/02 1725)   Comminuted, displaced patellar fracture  Final Result by Harriet Lambert MD (09/02 1809)      Mildly displaced acute comminuted patellar fracture  Findings concur with ED results as provided in PACS viewer/EPIC at the time of interpretation  Workstation performed: [id number]                    Procedures  Procedures       ED Course                               MDM  Number of Diagnoses or Management Options  Closed displaced comminuted fracture of left patella, initial encounter: new and requires workup  Diagnosis management comments: Patient presents with a mechanical fall and left knee injury  X-ray shows a comminuted, displaced patellar fracture  Patient is unable to bear weight  He is not safe for discharge at this time  Discussed case with hospitalist and Orthopedic surgery  Patient will be admitted for likely operative repair  Patient does not have evidence of other acute traumatic injuries  Patient is well-appearing at this time         Amount and/or Complexity of Data Reviewed  Clinical lab tests: ordered and reviewed  Tests in the radiology section of CPT®: reviewed and ordered  Tests in the medicine section of CPT®: reviewed and ordered  Review and summarize past medical records: yes  Discuss the patient with other providers: yes  Independent visualization of images, tracings, or specimens: yes    Risk of Complications, Morbidity, and/or Mortality  Presenting problems: high  Diagnostic procedures: moderate  Management options: high    Patient Progress  Patient progress: stable      Disposition  Final diagnoses:   Closed displaced comminuted fracture of left patella, initial encounter     Time reflects when diagnosis was documented in both MDM as applicable and the Disposition within this note     Time User Action Codes Description Comment    9/2/2019  6:07 PM Annie Fuentes Add [S82 042A] Closed displaced comminuted fracture of left patella, initial encounter     9/2/2019  6:38 PM Claudia Graves, [facility] Closed displaced comminuted fracture of left patella, initial encounter       ED Disposition     ED Disposition Condition Date/Time Comment    Admit Stable Mon Sep 2, 2019  6:07 PM Case was discussed with Dr Nino Rosales and the patient's admission status was agreed to be Admission Status: inpatient status to the service of Dr Nnio Rosales   Follow-up Information    None         Current Discharge Medication List      CONTINUE these medications which have NOT CHANGED    Details   amLODIPine (NORVASC) 5 mg tablet Take 5 mg by mouth daily      losartan (COZAAR) 100 MG tablet Take 100 mg by mouth daily      propranolol (INDERAL) 10 mg tablet Take 10 mg by mouth 3 (three) times a day           No discharge procedures on file      ED Provider  Electronically Signed by           Chiquita Deluna DO  09/02/19 2100 no known allergies

## 2024-07-13 ENCOUNTER — HOSPITAL ENCOUNTER (EMERGENCY)
Facility: HOSPITAL | Age: 89
Discharge: HOME | End: 2024-07-13
Attending: EMERGENCY MEDICINE
Payer: COMMERCIAL

## 2024-07-13 VITALS
WEIGHT: 136.24 LBS | RESPIRATION RATE: 16 BRPM | OXYGEN SATURATION: 97 % | HEART RATE: 80 BPM | SYSTOLIC BLOOD PRESSURE: 143 MMHG | DIASTOLIC BLOOD PRESSURE: 74 MMHG | TEMPERATURE: 98.5 F | BODY MASS INDEX: 26.61 KG/M2

## 2024-07-13 DIAGNOSIS — H61.23 BILATERAL IMPACTED CERUMEN: ICD-10-CM

## 2024-07-13 DIAGNOSIS — R51.9 ACUTE HEADACHE: Primary | ICD-10-CM

## 2024-07-13 PROCEDURE — 99284 EMERGENCY DEPT VISIT MOD MDM: CPT | Performed by: EMERGENCY MEDICINE

## 2024-07-13 PROCEDURE — 96372 THER/PROPH/DIAG INJ SC/IM: CPT

## 2024-07-13 PROCEDURE — 99283 EMERGENCY DEPT VISIT LOW MDM: CPT

## 2024-07-13 PROCEDURE — 69209 REMOVE IMPACTED EAR WAX UNI: CPT | Performed by: EMERGENCY MEDICINE

## 2024-07-13 RX ORDER — KETOROLAC TROMETHAMINE 30 MG/ML
30 INJECTION, SOLUTION INTRAMUSCULAR; INTRAVENOUS ONCE
Status: COMPLETED | OUTPATIENT
Start: 2024-07-13 | End: 2024-07-13

## 2024-07-13 RX ORDER — ACETAMINOPHEN 325 MG/1
975 TABLET ORAL ONCE
Status: COMPLETED | OUTPATIENT
Start: 2024-07-13 | End: 2024-07-13

## 2024-07-13 RX ADMIN — KETOROLAC TROMETHAMINE 30 MG: 30 INJECTION, SOLUTION INTRAMUSCULAR; INTRAVENOUS at 18:47

## 2024-07-13 RX ADMIN — ACETAMINOPHEN 975 MG: 325 TABLET, FILM COATED ORAL at 18:45

## 2024-07-14 NOTE — ED PROVIDER NOTES
History  Chief Complaint   Patient presents with    Headache     C/o headache x 1 week, worse today. Denies fall.      89-year-old male history of hypertension presenting with headache and ear discomfort.  Patient reports mild posterior headache for about 1 week.  Denies any known trauma or injury.  Denies any visual changes.  Denies any neurological changes such as motor or sensory deficits.  Nonmeningeal.  Reports ear fullness right greater than left with mild decreased hearing loss on the right.  Known history of wax and has been using eardrops but states that has not been helping.  Denies any chest pain shortness of breath.  Denies any other complaints.  Chart reviewed.    Past Medical History:  No date: BPH (benign prostatic hyperplasia)  No date: Hypertension  Family History: non-contributory  Social History          Prior to Admission Medications   Prescriptions Last Dose Informant Patient Reported? Taking?   acetaminophen (TYLENOL) 500 mg tablet   No No   Sig: Take 1 tablet (500 mg total) by mouth every 6 (six) hours as needed for mild pain or moderate pain   amLODIPine (NORVASC) 5 mg tablet   Yes No   Sig: Take 5 mg by mouth daily   losartan (COZAAR) 100 MG tablet   Yes No   Sig: Take 100 mg by mouth daily   propranolol (INDERAL) 10 mg tablet   Yes No   Sig: Take 10 mg by mouth 3 (three) times a day      Facility-Administered Medications: None       Past Medical History:   Diagnosis Date    BPH (benign prostatic hyperplasia)     Hypertension        Past Surgical History:   Procedure Laterality Date    EYE SURGERY      PROSTATE BIOPSY         History reviewed. No pertinent family history.  I have reviewed and agree with the history as documented.    E-Cigarette/Vaping    E-Cigarette Use Never User      E-Cigarette/Vaping Substances     Social History     Tobacco Use    Smoking status: Never    Smokeless tobacco: Never   Vaping Use    Vaping status: Never Used   Substance Use Topics    Alcohol use: Never     Drug use: Never       Review of Systems   Constitutional:  Negative for appetite change, chills, diaphoresis, fever and unexpected weight change.   HENT:  Positive for ear pain. Negative for congestion and rhinorrhea.    Eyes:  Negative for photophobia and visual disturbance.   Respiratory:  Negative for cough, chest tightness and shortness of breath.    Cardiovascular:  Negative for chest pain, palpitations and leg swelling.   Gastrointestinal:  Negative for abdominal distention, abdominal pain, blood in stool, constipation, diarrhea, nausea and vomiting.   Genitourinary:  Negative for dysuria and hematuria.   Musculoskeletal:  Negative for back pain, joint swelling, neck pain and neck stiffness.   Skin:  Negative for color change, pallor, rash and wound.   Neurological:  Negative for dizziness, syncope, weakness, light-headedness and headaches.   Psychiatric/Behavioral:  Negative for agitation.    All other systems reviewed and are negative.      Physical Exam  Physical Exam  Vitals and nursing note reviewed.   Constitutional:       General: He is not in acute distress.     Appearance: Normal appearance. He is well-developed. He is not ill-appearing, toxic-appearing or diaphoretic.   HENT:      Head: Normocephalic and atraumatic.      Right Ear: There is impacted cerumen.      Left Ear: There is impacted cerumen.      Nose: Nose normal. No congestion or rhinorrhea.      Mouth/Throat:      Mouth: Mucous membranes are moist.      Pharynx: Oropharynx is clear. No oropharyngeal exudate or posterior oropharyngeal erythema.   Eyes:      General: No scleral icterus.        Right eye: No discharge.         Left eye: No discharge.      Extraocular Movements: Extraocular movements intact.      Conjunctiva/sclera: Conjunctivae normal.      Pupils: Pupils are equal, round, and reactive to light.   Neck:      Vascular: No JVD.      Trachea: No tracheal deviation.      Comments: Supple. Normal range of motion.   Cardiovascular:       Rate and Rhythm: Normal rate and regular rhythm.      Heart sounds: Normal heart sounds. No murmur heard.     No friction rub. No gallop.      Comments: Normal rate and regular rhythm  Pulmonary:      Effort: Pulmonary effort is normal. No respiratory distress.      Breath sounds: Normal breath sounds. No stridor. No wheezing or rales.      Comments: Clear to auscultation bilaterally  Chest:      Chest wall: No tenderness.   Abdominal:      General: Bowel sounds are normal. There is no distension.      Palpations: Abdomen is soft.      Tenderness: There is no abdominal tenderness. There is no right CVA tenderness, left CVA tenderness, guarding or rebound.      Comments: Soft, nontender, nondistended.  Normal bowel sounds throughout   Musculoskeletal:         General: No swelling, tenderness, deformity or signs of injury. Normal range of motion.      Cervical back: Normal range of motion and neck supple. No rigidity. No muscular tenderness.      Right lower leg: No edema.      Left lower leg: No edema.   Lymphadenopathy:      Cervical: No cervical adenopathy.   Skin:     General: Skin is warm and dry.      Coloration: Skin is not pale.      Findings: No erythema or rash.   Neurological:      General: No focal deficit present.      Mental Status: He is alert. Mental status is at baseline.      Sensory: No sensory deficit.      Motor: No weakness or abnormal muscle tone.      Coordination: Coordination normal.      Gait: Gait normal.      Comments: Alert.  Strength and sensation grossly intact.  Ambulatory without difficulty at baseline.    Psychiatric:         Behavior: Behavior normal.         Thought Content: Thought content normal.         Vital Signs  ED Triage Vitals   Temperature Pulse Respirations Blood Pressure SpO2   07/13/24 1821 07/13/24 1821 07/13/24 1821 07/13/24 1821 07/13/24 1821   98.5 °F (36.9 °C) 80 16 143/74 97 %      Temp Source Heart Rate Source Patient Position - Orthostatic VS BP Location  "FiO2 (%)   07/13/24 1821 07/13/24 1821 07/13/24 1821 07/13/24 1821 --   Temporal Monitor Sitting Left arm       Pain Score       07/13/24 1845       7           Vitals:    07/13/24 1821   BP: 143/74   Pulse: 80   Patient Position - Orthostatic VS: Sitting         Visual Acuity      ED Medications  Medications   acetaminophen (TYLENOL) tablet 975 mg (975 mg Oral Given 7/13/24 1845)   ketorolac (TORADOL) injection 30 mg (30 mg Intramuscular Given 7/13/24 1847)       Diagnostic Studies  Results Reviewed       None                   No orders to display              Procedures  Ear cerumen removal    Date/Time: 7/13/2024 7:00 PM    Performed by: Nahun Dumas MD  Authorized by: Nahun Dumas MD  Universal Protocol:  Consent: Verbal consent obtained.  Risks and benefits: risks, benefits and alternatives were discussed  Consent given by: patient  Time out: Immediately prior to procedure a \"time out\" was called to verify the correct patient, procedure, equipment, support staff and site/side marked as required.  Timeout called at: 7/13/2024 7:00 PM.  Patient understanding: patient states understanding of the procedure being performed  Patient consent: the patient's understanding of the procedure matches consent given  Procedure consent: procedure consent matches procedure scheduled  Relevant documents: relevant documents present and verified  Test results: test results available and properly labeled  Site marked: the operative site was marked  Radiology Images displayed and confirmed. If images not available, report reviewed: imaging studies available  Required items: required blood products, implants, devices, and special equipment available  Patient identity confirmed: verbally with patient    Patient location:  ED  Procedure details:     Local anesthetic:  None    Location:  L ear and R ear    Procedure type: irrigation only      Approach:  Natural orifice  Post-procedure details:     Complication:  None    Hearing " quality:  Improved    Patient tolerance of procedure:  Tolerated well, no immediate complications           ED Course                                 SBIRT 20yo+      Flowsheet Row Most Recent Value   Initial Alcohol Screen: US AUDIT-C     1. How often do you have a drink containing alcohol? 0 Filed at: 07/13/2024 1822   2. How many drinks containing alcohol do you have on a typical day you are drinking?  0 Filed at: 07/13/2024 1822   3b. FEMALE Any Age, or MALE 65+: How often do you have 4 or more drinks on one occassion? 0 Filed at: 07/13/2024 1822   Audit-C Score 0 Filed at: 07/13/2024 1822   LUCINDA: How many times in the past year have you...    Used an illegal drug or used a prescription medication for non-medical reasons? Never Filed at: 07/13/2024 1822                      Medical Decision Making  89-year-old male history of hypertension presenting with headache and ear discomfort.  Bilateral impacted cerumen.  Plan for symptom management with oral and IV pain medication.  Will attempt to irrigate ears.  Reassess.    Bilateral ears successfully cleaned of cerumen.  Symptoms resolved after medication and removal of cerumen.  Headache precautions. Discussed results and recommendations. Advised follow up PCP. Medication recommendations. Given instructions and return precautions. Patient/family at bedside acknowledged understanding of all written and verbal instructions and return precautions. Discharged.     Risk  OTC drugs.  Prescription drug management.                 Disposition  Final diagnoses:   Acute headache   Bilateral impacted cerumen     Time reflects when diagnosis was documented in both MDM as applicable and the Disposition within this note       Time User Action Codes Description Comment    7/13/2024  8:00 PM Nahun Dumas Add [R51.9] Acute headache     7/13/2024  8:00 PM Nahun Dumas Add [H61.23] Bilateral impacted cerumen           ED Disposition       ED Disposition   Discharge    Condition   Stable     Date/Time   Sat Jul 13, 2024 2000    Comment   Efrain Thorne discharge to home/self care.                   Follow-up Information       Follow up With Specialties Details Why Contact Info    [facility]  Call  for [phone]              Discharge Medication List as of 7/13/2024  8:01 PM        CONTINUE these medications which have NOT CHANGED    Details   acetaminophen (TYLENOL) 500 mg tablet Take 1 tablet (500 mg total) by mouth every 6 (six) hours as needed for mild pain or moderate pain, Starting Wed 9/4/2019, Print      amLODIPine (NORVASC) 5 mg tablet Take 5 mg by mouth daily, Historical Med      losartan (COZAAR) 100 MG tablet Take 100 mg by mouth daily, Historical Med      propranolol (INDERAL) 10 mg tablet Take 10 mg by mouth 3 (three) times a day, Historical Med             No discharge procedures on file.    PDMP Review       None            ED Provider  Electronically Signed by             Nahun Dumas MD  07/13/24 1562